# Patient Record
Sex: FEMALE | Race: WHITE
[De-identification: names, ages, dates, MRNs, and addresses within clinical notes are randomized per-mention and may not be internally consistent; named-entity substitution may affect disease eponyms.]

---

## 2018-12-18 ENCOUNTER — HOSPITAL ENCOUNTER (OUTPATIENT)
Dept: HOSPITAL 80 - F3N | Age: 38
Discharge: HOME | End: 2018-12-18
Attending: OBSTETRICS & GYNECOLOGY
Payer: COMMERCIAL

## 2018-12-18 VITALS — SYSTOLIC BLOOD PRESSURE: 110 MMHG | DIASTOLIC BLOOD PRESSURE: 75 MMHG

## 2018-12-18 DIAGNOSIS — N93.0: Primary | ICD-10-CM

## 2018-12-18 DIAGNOSIS — K51.90: ICD-10-CM

## 2018-12-18 DIAGNOSIS — N84.1: ICD-10-CM

## 2018-12-18 PROCEDURE — C1782 MORCELLATOR: HCPCS

## 2018-12-18 PROCEDURE — 0UDB8ZX EXTRACTION OF ENDOMETRIUM, VIA NATURAL OR ARTIFICIAL OPENING ENDOSCOPIC, DIAGNOSTIC: ICD-10-PCS | Performed by: OBSTETRICS & GYNECOLOGY

## 2018-12-18 NOTE — PDGENHP
History and Physical





- Chief Complaint


postcoital bleeding and suspected endometrial polyp





- History of Present Illness


37 yo with postcoital bleeding over the past 6 months and endometrial polyp 

found on ultrasound, approximately 1cm. 


Options discussed and agreed to proceed with hysteroscopy with polypectomy and 

endometrial sampling. 





History Information





- Allergies/Home Medication List


Allergies/Adverse Reactions: 








No Known Allergies Allergy (Unverified 12/07/18 14:59)


 





Home Medications: 








NK [No Known Home Meds]  12/07/18 [Last Taken Unknown]





I have personally reviewed and updated: family history, medical history, social 

history, surgical history


Past Medical History: ulcerative colitis - diet managed





- Surgical History


Additional surgical history: none





- Family History


Positive for: non-pertinent





- Social History


Smoking Status: Never smoked


Alcohol Use: Occasionally


Drug Use: None





Review of Systems


Review of Systems: 





ROS: 10pt was reviewed & negative except for what was stated in HPI & below





Physical Exam


Physical Exam: 

















Temp Pulse Resp BP Pulse Ox


 


 36.8 C   66   16   108/72   99 


 


 12/18/18 09:33  12/18/18 11:31  12/18/18 11:31  12/18/18 11:31  12/18/18 11:31











Constitutional: no apparent distress, appears nourished


Eyes: PERRL, anicteric sclera, EOMI


Ears, Nose, Mouth, Throat: moist mucous membranes, hearing normal, ears appear 

normal


Cardiovascular: regular rate and rhythym


Respiratory: no respiratory distress, no rales or rhonchi


Gastrointestinal: normoactive bowel sounds, soft, non-tender abdomen, no 

palpable masses


Genitourinary: no bladder fullness


Skin: normal color


Musculoskeletal: full muscle strength


Neurologic: AAOx3


Psychiatric: interacting appropriately


Lymph, Heme, Immunologic: no cervical LAD





Lab Data & Imaging Review


EKG Interpretation: Positive for: normal sinsus rhythm





Assessment & Plan


Assessment: 





37 yo with postcoital bleeding and suspected endometrial polyp. 


Written informed consent obtained. 


Will proceed with hysteroscopy, polypectomy and endometrial sampling. 


Moraima Bernard MD, FACOG


[facility]

## 2018-12-18 NOTE — POSTOPPROG
Post Op Note


Date of Operation: 12/18/18


Surgeon: Moraima Bernard


Anesthesiologist: Sinan Franklin


Anesthesia: GET(General Endotracheal)


Pre-op Diagnosis: postcoital bleeding, suspected endometrial polyp


Post-op Diagnosis: same


Indication: postcoital bleeding and suspected endometrial polyp


Procedure: hysteroscopy, polypectomy, endometrial curetting


Findings: uterus sounded to 7cm, anterior wall polyp


Inf/Abcess present in the surg proc area at time of surgery?: No


EBL: Minimal


Total fluids administered: 750


Complications: 





none





Specimen(s): 





endometrial polyp and curettings

## 2018-12-18 NOTE — PDANEPAE
ANE History of Present Illness





hysteroscopy





ANE Past Medical History





- Cardiovascular History


Hx Hypertension: No


Hx Arrhythmias: No


Hx Chest Pain: No


Hx Coronary Artery / Peripheral Vascular Disease: No


Hx CHF / Valvular Disease: No


Hx Palpitations: Yes





- Pulmonary History


Hx COPD: No


Hx Asthma/Reactive Airway Disease: No


Hx Recent Upper Respiratory Infection: No


Hx Oxygen in Use at Home: No


Hx Sleep Apnea: No


Sleep Apnea Screening Result - Last Documented: Negative





- Neurologic History


Hx Cerebrovascular Accident: No


Hx Seizures: No


Hx Dementia: No





- Endocrine History


Hx Diabetes: No





- Renal History


Hx Renal Disorders: No





- Liver History


Hx Hepatic Disorders: No





- Neurological & Psychiatric Hx


Hx Neurological and Psychiatric Disorders: Yes


Neurological / Psychiatric History Comment: situational anxiety





- Cancer History


Hx Cancer: No





- Congenital Disorder History


Hx Congenital Disorders: No





- GI History


Hx Gastrointestinal Disorders: Yes


Gastrointestinal History Comment: ulcerative colitis





- Other Health History


Other Health History: endometiosis.  increased bleeding with colitis





- Chronic Pain History


Chronic Pain: No





- Surgical History


Prior Surgeries: wisdom teeth removal





ANE Review of Systems


Review of Systems: 








- Exercise capacity


METS (RN): 4 METS





ANE Patient History





- Allergies


Allergies/Adverse Reactions: 








No Known Allergies Allergy (Unverified 12/07/18 14:59)


 








- Home Medications


Home medications: home medication list seen and reviewed


Home Medications: 








NK [No Known Home Meds]  12/07/18 [Last Taken Unknown]








- NPO status


NPO Since - Liquids (Date): 12/18/18


NPO Since - Liquids (Time): 07:00


NPO Since - Solids (Date): 12/17/18


NPO Since - Solids (Time): 20:00





- Anes Hx


Anes Hx: no prior problems





- Smoking Hx


Smoking Status: Never smoked





- Family Anes Hx


Family Hx Anesthesia Complications: none





ANE Labs/Vital Signs





- Vital Signs


Blood Pressure: 108/72


Heart Rate: 66


Respiratory Rate: 16


O2 Sat (%): 99


Height: 162.56 cm


Weight: 58.967 kg





ANE Physical Exam





- Airway


Neck exam: FROM


Mallampati Score: Class 1


Mouth exam: normal dental/mouth exam





- Pulmonary


Pulmonary: no respiratory distress





- Cardiovascular


Cardiovascular: regular rate and rhythym





- ASA Status


ASA Status: II





ANE Anesthesia Plan


Anesthesia Plan: GA w LMA

## 2018-12-18 NOTE — GOP
DATE OF OPERATION:  12/18/2018



SURGEON:  Moraima Bernard MD



ANESTHESIA:  General endotracheal.



ANESTHESIOLOGIST:  Sinan Franklin MD



PREOPERATIVE DIAGNOSIS:  

1.  Postcoital bleeding.

2.  Suspected endometrial polyp.



POSTOPERATIVE DIAGNOSIS:  

1.  Postcoital bleeding.

2.  Suspected endometrial polyp.



PROCEDURE PERFORMED:  

1.  Hysteroscopy.  

2.  Polypectomy.

3.  Endometrial curettings.



FINDINGS:  Uterus sounded to 7 cm.  Normal-appearing ostia bilaterally.  Normal-
appearing endometrial cavity, except for an anterior wall polyp, and a fluffy 
endometrial lining.



SPECIMENS:  Endometrial polyp and endometrial curettings.



ESTIMATED BLOOD LOSS:  20 mL.



INDICATIONS:  38-year-old, para 3 female with persistent postcoital bleeding 
over the past 6 months.  Ultrasound imaging suggested an endometrial polyp.



DESCRIPTION OF PROCEDURE:  A bedside history and physical exam was done for the 
patient in the preoperative area.  Her ultrasound findings from the office were 
again reviewed.  Written informed consent was obtained.  The patient was taken 
the operating room and placed in the dorsal supine position.  When general 
anesthesia was deemed adequate, she was placed in modified dorsal lithotomy 
position using the Yellofin stirrups.  She was prepared and draped in the 
standard fashion.  A time-out was performed. 



A speculum was inserted into vagina.  A paracervical block using 0.25% Marcaine 
was placed.  A tenaculum was placed on the anterior lip of the cervix.  The 
uterus sounded to 7 cm.  The cervix was dilated to 6 mm.  The hysteroscope was 
inserted with the above findings.  The [employer] scope was 
inserted with the polyp blade.  The polyp blade was used to remove the entire 
polyp and sample the entire endometrial cavity. 



The hysteroscope was then removed.  The tenaculum was removed from the anterior 
lip of the cervix.  Hemostasis of the tenaculum sites was obtained with direct 
pressure and the application of silver nitrate.  The saline hysteroscopy fluid 
deficit was 120 mL.  The amount of saline used was 2130 mL.  Sponge, lap, 
needle counts were correct x2.  The patient was extubated in the operating room
, taken to the recovery room in stable condition.



TOTAL INTRAVENOUS FLUIDS ADMINISTERED:  750 mL.



URINE OUTPUT:  Not measured as the patient voided immediately prior to the 
procedure.



COMPLICATIONS:  None.





Job #:  [id number]

MTDD